# Patient Record
Sex: FEMALE | Race: WHITE | URBAN - METROPOLITAN AREA
[De-identification: names, ages, dates, MRNs, and addresses within clinical notes are randomized per-mention and may not be internally consistent; named-entity substitution may affect disease eponyms.]

---

## 2022-10-26 ENCOUNTER — OFFICE VISIT (OUTPATIENT)
Dept: ENT CLINIC | Age: 67
End: 2022-10-26
Payer: COMMERCIAL

## 2022-10-26 VITALS — BODY MASS INDEX: 20.83 KG/M2 | WEIGHT: 125 LBS | HEIGHT: 65 IN

## 2022-10-26 DIAGNOSIS — R09.82 POST-NASAL DRAINAGE: ICD-10-CM

## 2022-10-26 DIAGNOSIS — Z01.818 PREOP TESTING: ICD-10-CM

## 2022-10-26 DIAGNOSIS — J39.2 THROAT MASS: ICD-10-CM

## 2022-10-26 DIAGNOSIS — J35.1 HYPERTROPHY OF LINGUAL TONSIL: ICD-10-CM

## 2022-10-26 DIAGNOSIS — J38.01 COMPLETE PARALYSIS OF LEFT VOCAL CORD: Primary | ICD-10-CM

## 2022-10-26 DIAGNOSIS — R09.81 NASAL CONGESTION: ICD-10-CM

## 2022-10-26 PROCEDURE — 31575 DIAGNOSTIC LARYNGOSCOPY: CPT | Performed by: NURSE PRACTITIONER

## 2022-10-26 PROCEDURE — 1123F ACP DISCUSS/DSCN MKR DOCD: CPT | Performed by: NURSE PRACTITIONER

## 2022-10-26 PROCEDURE — 99204 OFFICE O/P NEW MOD 45 MIN: CPT | Performed by: NURSE PRACTITIONER

## 2022-10-26 RX ORDER — PREDNISONE 20 MG/1
20 TABLET ORAL DAILY
Qty: 7 TABLET | Refills: 0 | Status: SHIPPED | OUTPATIENT
Start: 2022-10-26 | End: 2022-11-02

## 2022-10-26 RX ORDER — AZELASTINE 1 MG/ML
1-2 SPRAY, METERED NASAL 2 TIMES DAILY PRN
Qty: 30 ML | Refills: 1 | Status: SHIPPED | OUTPATIENT
Start: 2022-10-26

## 2022-10-26 ASSESSMENT — ENCOUNTER SYMPTOMS
RESPIRATORY NEGATIVE: 1
SHORTNESS OF BREATH: 0
STRIDOR: 0
EYES NEGATIVE: 1
VOICE CHANGE: 1
RHINORRHEA: 0
SINUS PRESSURE: 0
TROUBLE SWALLOWING: 1
SINUS PAIN: 0

## 2022-10-26 NOTE — PROGRESS NOTES
[facility] Otolaryngology  Dr. Basmi Shearer. IDANIA Estrella Ms.Ed. New Consult       Patient Name:  Emmanuelle Savage  :  1955     CHIEF C/O:    Chief Complaint   Patient presents with    New Patient     NP hoarseness/swallowing issues X 6 weeks after sinus infection       HISTORY OBTAINED FROM:  patient    HISTORY OF PRESENT ILLNESS:       Tristan Yates is a 78y.o. year old female, here today for hoarseness and dysphagia, sinus congestion. Symptoms for 6 weeks  Started after sinus infection  Treated with antibiotics by PCP, sinus symptoms have improved  Was having left ear pain that has resolved  Dysphagia that has improved, feels like phlegm still in throat  Persistent hoarseness of her voice, little improvement since onset  Denies any tobacco hx   Continues to have persistent PND with cough  No current sinus congestion, rhinorrhea, sinus pain or pressure  Does have a hx of acid reflux, was treated several years ago for H Pylori  Continues to have persistent globus sensation  Family hx of throat cancer - uncles with smoking hx          Past Medical History:   Diagnosis Date    Shortness of breath      Past Surgical History:   Procedure Laterality Date    TUBAL LIGATION      WISDOM TOOTH EXTRACTION N/A        Current Outpatient Medications:     ondansetron (ZOFRAN ODT) 4 MG disintegrating tablet, Take 1 tablet by mouth every 8 hours as needed for Nausea or Vomiting, Disp: 12 tablet, Rfl: 0    albuterol (VENTOLIN HFA) 108 (90 BASE) MCG/ACT inhaler, Inhale 2 puffs into the lungs every 6 hours as needed for Wheezing or Shortness of Breath, Disp: 1 Inhaler, Rfl: 3    Bhcaffaaa-VJE-EP-APAP (RADU-SELTZER PLUS COLD & FLU PO), Take by mouth, Disp: , Rfl:   Patient has no known allergies. Social History     Tobacco Use    Smoking status: Never    Smokeless tobacco: Never   Substance Use Topics    Alcohol use: Yes    Drug use: No     History reviewed. No pertinent family history. Review of Systems   Constitutional: Negative. Negative for activity change and appetite change. HENT:  Positive for postnasal drip, trouble swallowing and voice change (Hoarseness). Negative for congestion, rhinorrhea, sinus pressure and sinus pain. Eyes: Negative. Respiratory: Negative. Negative for shortness of breath and stridor. Cardiovascular: Negative. Negative for chest pain and palpitations. Endocrine: Negative. Musculoskeletal: Negative. Skin: Negative. Neurological: Negative. Negative for dizziness. Hematological: Negative. Psychiatric/Behavioral: Negative. Ht 5' 5\" (1.651 m)   Wt 125 lb (56.7 kg)   BMI 20.80 kg/m²   Physical Exam  Constitutional:       Appearance: Normal appearance. HENT:      Head: Normocephalic. Right Ear: Tympanic membrane, ear canal and external ear normal.      Left Ear: Tympanic membrane, ear canal and external ear normal.      Nose: Congestion present. Right Turbinates: Not pale. Left Turbinates: Not pale. Mouth/Throat:      Lips: Pink. Mouth: Mucous membranes are moist.     Eyes:      Conjunctiva/sclera: Conjunctivae normal.      Pupils: Pupils are equal, round, and reactive to light. Cardiovascular:      Rate and Rhythm: Normal rate and regular rhythm. Pulses: Normal pulses. Pulmonary:      Effort: Pulmonary effort is normal. No respiratory distress. Breath sounds: No stridor. Musculoskeletal:         General: Normal range of motion. Cervical back: Normal range of motion. No rigidity. No muscular tenderness. Skin:     General: Skin is warm and dry. Neurological:      General: No focal deficit present. Mental Status: She is alert and oriented to person, place, and time. Psychiatric:         Mood and Affect: Mood normal.         Behavior: Behavior normal.         Thought Content:  Thought content normal.         Judgment: Judgment normal.       IMPRESSION/PLAN:  Endoscopy Procedure Note    Pre-operative Diagnosis: hoarseness    Post-operative Diagnosis: normal, left vocal cord paralysis, left throat mass, hypertrophy of lingual tonsils    Indications: Hoarseness, dysphagia or aspiration - not able to be clearly evaluated by indirect laryngoscopy  Evaluation of the larynx and immediate subglottis - unable to be visualized by mirror examination    Anesthesia: Lidocaine 4% and Eliseo-Synephrine 1/2%    Endoscopy Type:  laryngoscopy    Procedure Details   With the patient sitting upright in the examining chair informed consent was obtained. The right side(s) of the nose was topically anesthetized with spray. After waiting an appropriate period of time for anesthesia/ vasoconstriction to become effective, the flexible fiberoptic  flexible laryngoscope was passed through the right side(s) of the nose, and the nose, nasopharynx, oropharynx, hypopharynx and larynx were examined. An identical procedure was performed on the contralateral side. Examination was performed during quiet respiration and with phonation. I was present for the entire procedure. The following findings were noted. Findings:  Mucosa:  swollen   Nasal septum:  deviated to left   Turbinates:  swollen   Adenoid:  normal   Eustachian tubes:  normal   Mucous stranding:  present   Lesions:  absent   Modified Catalan's Maneuver not indicated   Larynx Subglottis is patent. Lesion of subglottis noted. Inflammation of the left lingual tonsil with left vocal cord paralysis and hooding of the left arytenoid. Significant edema       Condition:  Stable    Complications:  None    Pictures:                      Elisacalderonellie Rowell was seen today for new patient. Diagnoses and all orders for this visit:    Complete paralysis of left vocal cord  -     CT SOFT TISSUE NECK W WO CONTRAST; Future  -     CT CHEST W WO CONTRAST; Future    Throat mass  -     CT SOFT TISSUE NECK W WO CONTRAST; Future  -     CT CHEST W WO CONTRAST;  Future    Hypertrophy of lingual tonsil  -     CT SOFT TISSUE NECK W WO CONTRAST; Future  -     CT CHEST W WO CONTRAST; Future    Preop testing  -     BUN; Future  -     Creatinine; Future    Post-nasal drainage    Nasal congestion    Other orders  -     predniSONE (DELTASONE) 20 MG tablet; Take 1 tablet by mouth daily for 7 days  -     azelastine (ASTELIN) 0.1 % nasal spray; 1-2 sprays by Nasal route 2 times daily as needed for Rhinitis Use in each nostril as directed    Patient seen and examined today for 6-week history of hoarse voice. Due to her symptoms and family history of throat cancer a flexible laryngoscope was performed in the office today revealing paralysis of the left vocal cord, hooding of the left arytenoid, and swelling/mass in the left lingual tonsil region. There is significant edema in the entire area. At this time patient will be placed on prednisone, 20 mg once daily for 7 days as well as Astelin spray, 1 to 2 sprays twice daily as needed for postnasal drainage symptoms. She will also undergo a CT of the neck and chest with and without contrast for further evaluation of the left mass. She will follow-up in 1 month with Dr. Nisha Evans for reevaluation and possible repeat scope. She instructed to call with any new or worsening symptoms prior to her next appointment.       Nick Gomez, MSN, FNP-C  [facility], Nose and Throat    The information contained in this note has been dictated using drug and medical speech recognition software and may contain errors

## 2022-11-01 ENCOUNTER — TELEPHONE (OUTPATIENT)
Dept: ENT CLINIC | Age: 67
End: 2022-11-01

## 2022-11-01 DIAGNOSIS — J39.2 THROAT MASS: Primary | ICD-10-CM

## 2022-11-01 DIAGNOSIS — J38.01 COMPLETE PARALYSIS OF LEFT VOCAL CORD: ICD-10-CM

## 2022-11-01 DIAGNOSIS — J35.1 HYPERTROPHY OF LINGUAL TONSIL: ICD-10-CM

## 2022-11-01 DIAGNOSIS — Z01.818 PREOP TESTING: ICD-10-CM

## 2022-11-01 LAB
BUN BLDV-MCNC: 12 MG/DL (ref 6–23)
CREAT SERPL-MCNC: 0.7 MG/DL (ref 0.5–1)
GFR SERPL CREATININE-BSD FRML MDRD: >60 ML/MIN/1.73

## 2022-11-01 NOTE — TELEPHONE ENCOUNTER
Pt needs blood work done today at [address] [facility] in [facility]. Pt has been left VM regarding this issue and told that if she cannot have labs done the imaging will have to be rescheduled. Pt can have labs done 90 minutes prior in the AM but they need to be done at [facility].

## 2022-11-01 NOTE — TELEPHONE ENCOUNTER
[place] from ct scan [facility] called and stated that usually they do not do Ct scan chest and soft tissue neck with and without contrast. If they do both usually it is with only .  Also this patient has not had her lab work done yet which can delay the testing tomorrow

## 2022-11-01 NOTE — TELEPHONE ENCOUNTER
Called patient and left a message on answering machine that they were concerned results would not be back in time for her scans tomorrrow if she waits til then to get them done. Called FP (Dr. Camron Stoddard) to see if she has had any lab work recently .  office did not have any labs after June 2022

## 2022-11-10 ENCOUNTER — TELEPHONE (OUTPATIENT)
Dept: ENT CLINIC | Age: 67
End: 2022-11-10

## 2022-11-11 NOTE — TELEPHONE ENCOUNTER
Returned call to patient with results who stated understanding. Reminded of f/u apt date/time/location.

## 2022-12-02 ENCOUNTER — OFFICE VISIT (OUTPATIENT)
Dept: ENT CLINIC | Age: 67
End: 2022-12-02
Payer: COMMERCIAL

## 2022-12-02 ENCOUNTER — TELEPHONE (OUTPATIENT)
Dept: ENT CLINIC | Age: 67
End: 2022-12-02

## 2022-12-02 ENCOUNTER — PREP FOR PROCEDURE (OUTPATIENT)
Dept: ENT CLINIC | Age: 67
End: 2022-12-02

## 2022-12-02 VITALS
BODY MASS INDEX: 21.66 KG/M2 | WEIGHT: 130 LBS | HEIGHT: 65 IN | SYSTOLIC BLOOD PRESSURE: 116 MMHG | HEART RATE: 89 BPM | DIASTOLIC BLOOD PRESSURE: 67 MMHG

## 2022-12-02 DIAGNOSIS — R09.81 NASAL CONGESTION: ICD-10-CM

## 2022-12-02 DIAGNOSIS — J38.01 COMPLETE PARALYSIS OF LEFT VOCAL CORD: Primary | ICD-10-CM

## 2022-12-02 PROBLEM — J38.00 VOCAL CORD PARALYSIS: Status: ACTIVE | Noted: 2022-12-02

## 2022-12-02 PROCEDURE — 1123F ACP DISCUSS/DSCN MKR DOCD: CPT | Performed by: OTOLARYNGOLOGY

## 2022-12-02 PROCEDURE — 99213 OFFICE O/P EST LOW 20 MIN: CPT | Performed by: OTOLARYNGOLOGY

## 2022-12-02 NOTE — PROGRESS NOTES
[facility] Otolaryngology  Dr. Elliott Severe. Hoyle Feast. Ms.Ed        Patient Name:  Bing Martins  :  1955     CHIEF C/O:    Chief Complaint   Patient presents with    Other     CT Results       HISTORY OBTAINED FROM:  patient    HISTORY OF PRESENT ILLNESS:       Peace Palomino is a 78y.o. year old female, here today for follow up of here for evaluation of left true vocal cord paralysis of idiopathic etiology. Patient underwent a CT scan of the skull-based the chest demonstrated no significant recurrent laryngeal nerve pathology which was not found today. Patient continues to have a significantly hoarse for significant difficulty with projection and at times ambulation due to the lack of compression. Denies any nausea vomiting denies any active rest shortness of breath. Denies any nasal congestion epistaxis no complaints of neck mass tumor lesions. Past Medical History:   Diagnosis Date    Shortness of breath      Past Surgical History:   Procedure Laterality Date    TUBAL LIGATION      WISDOM TOOTH EXTRACTION N/A        Current Outpatient Medications:     azelastine (ASTELIN) 0.1 % nasal spray, 1-2 sprays by Nasal route 2 times daily as needed for Rhinitis Use in each nostril as directed, Disp: 30 mL, Rfl: 1    Oywdzvnqz-JNN-RJ-APAP (RADU-SELTZER PLUS COLD & FLU PO), Take by mouth, Disp: , Rfl:     ondansetron (ZOFRAN ODT) 4 MG disintegrating tablet, Take 1 tablet by mouth every 8 hours as needed for Nausea or Vomiting, Disp: 12 tablet, Rfl: 0    albuterol (VENTOLIN HFA) 108 (90 BASE) MCG/ACT inhaler, Inhale 2 puffs into the lungs every 6 hours as needed for Wheezing or Shortness of Breath, Disp: 1 Inhaler, Rfl: 3  Patient has no known allergies. Social History     Tobacco Use    Smoking status: Never    Smokeless tobacco: Never   Substance Use Topics    Alcohol use: Yes    Drug use: No     No family history on file. Review of Systems   Constitutional:  Negative for chills and fever.    HENT:  Positive for voice change. Negative for ear discharge and hearing loss. Respiratory:  Negative for cough and shortness of breath. Cardiovascular:  Negative for chest pain and palpitations. Gastrointestinal:  Negative for vomiting. Skin:  Negative for rash. Allergic/Immunologic: Negative for environmental allergies. Neurological:  Negative for dizziness and headaches. Hematological:  Does not bruise/bleed easily. All other systems reviewed and are negative. /67 (Site: Left Upper Arm, Position: Sitting, Cuff Size: Medium Adult)   Pulse 89   Ht 5' 5\" (1.651 m)   Wt 130 lb (59 kg)   BMI 21.63 kg/m²   Physical Exam  Vitals and nursing note reviewed. Constitutional:       Appearance: She is well-developed. HENT:      Head: Normocephalic and atraumatic. Right Ear: Tympanic membrane, ear canal and external ear normal.      Left Ear: Tympanic membrane, ear canal and external ear normal.      Nose: No congestion or rhinorrhea. Mouth/Throat:      Mouth: Mucous membranes are dry. Eyes:      Pupils: Pupils are equal, round, and reactive to light. Neck:      Thyroid: No thyromegaly. Trachea: No tracheal deviation. Cardiovascular:      Rate and Rhythm: Normal rate. Pulmonary:      Effort: Pulmonary effort is normal. No respiratory distress. Musculoskeletal:         General: No tenderness. Normal range of motion. Cervical back: Normal range of motion and neck supple. Skin:     General: Skin is warm and dry. Neurological:      Mental Status: She is alert. Cranial Nerves: No cranial nerve deficit. IMPRESSION/PLAN:  Patient seen and examined for history of left-sided vocal cord paralysis with negative CT scan for recurrent laryngeal nerve pathology. Patient was scheduled for microlaryngoscopy left vocal cord augmentation risk and benefit occluding bleeding infection hoarseness and need for future surgeries all reviewed today. Dr. Mauro Estrella D.O., Ms. Ed.  Otolaryngology Facial Plastic Surgery  :  [id number] [facility] Otolaryngology/Facial Plastic Surgery Residency  Associate Clinical Professor:  Cierra Kwan [facility]

## 2022-12-02 NOTE — TELEPHONE ENCOUNTER
Prior Authorization Form:      DEMOGRAPHICS:                     Patient Name:  Cici Gloria  Patient :  1955            Insurance:  Payor: Jahaira Nj / Plan: Jahaira N[place] / Product Type: *No Product type* /   Insurance ID Number:    Payer/Plan Subscr  Sex Relation Sub. Ins. ID Effective Group Num   1. [address] [facility] J 1955 Female Self [id number]                                    [id number]   2.  MEDICARE - [place] 1955 Female Self [id number]                                    [id number]         DIAGNOSIS & PROCEDURE:                       Procedure/Operation: MICROLARYNGSCOPY WITH LEFT TRUE VOCAL CORD AUGMENTATION           CPT Code: 44451 [id number]     Diagnosis:  LEFT TRUE VOCAL CORD PARALYSIS     ICD10 Code: J38.00    Location:  [facility]    Surgeon:  Hilton Bolanos INFORMATION:                          Date: 23    Time: N/A              Anesthesia:  General                                                       Status:  Outpatient        Special Comments:  Jag Barreto       Electronically signed by Kalli Mccormack MA on 2022 at 8:22 AM

## 2022-12-02 NOTE — PATIENT INSTRUCTIONS
SURGERY:_____/_____/_____    Nothing to eat or drink after midnight the night before surgery unless surgery is at [facility] or otherwise instructed by the hospital.    DO NOT TAKE ANY ASPIRIN PRODUCTS 7 days prior to surgery. Tylenol only. No Advil, Motrin, Aleve, or Ibuprofen. IF YOU ARE ON BLOOD THINNERS (PLAVIX, COUMADIN, ELIQUIS ETC) THESE WILL ALSO NEED TO BE HELD. Any illegal drugs in your system (including Marijuana even if legally prescribed) will result in your surgery being cancelled. Please be sure to check with our office or the hospital on time frame for the drugs to be out of your system. SHOULD YOUR INSURANCE CHANGE AT ANY TIME YOU MUST CONTACT OUR OFFICE. FAILURE TO DO SO MAY RESULT IN YOUR SURGERY BEING RESCHEDULED OR YOU MAY BE CHARGED AS SELF-PAY. Due to the high demand for surgery at our practice, if you cancel or reschedule your surgery two (2) times we may not reschedule you. If you need FMLA or Short Term Disability paperwork completed for your surgery, please complete your portion, ensure your name and date of birth are on them and fax them to [phone] asap. Paperwork can take up to 2 weeks to be completed. If you have any questions or concerns regarding your surgery, please contact the Surgery SchedulerMkuldeep Rogers at [phone] option 2. If you need medical clearance, you are responsible to contact your physician(s) to schedule an appointment for clearance. If clearance is not completed within 30 days of your surgery it may be cancelled. Our office will fax the appropriate forms that need to be completed to your physician(s). The location of your surgery will call you the day prior to your surgery date to let you know what time you have to be there and any other details. (they usually don't call until late afternoon- early evening.)- IF YOU HAVE QUESTIONS REGARDING THE TIME OF YOUR SURGERY, PLEASE CALL THE FACILITY YOU ARE SCHEDULED AT.              [facility], Demetrio Lopes [address] will call you a couple days prior to surgery and give you further instructions, if you have any questions, you can reach them at [phone] (per Pre-Admission testing, EKG is required for all patients age 53+, have a diagnosis of hypertension, diabetes, or on dialysis).

## 2022-12-29 ASSESSMENT — ENCOUNTER SYMPTOMS
COUGH: 0
VOMITING: 0
SHORTNESS OF BREATH: 0
VOICE CHANGE: 1

## 2023-01-11 ENCOUNTER — ANESTHESIA EVENT (OUTPATIENT)
Dept: OPERATING ROOM | Age: 68
End: 2023-01-11
Payer: MEDICARE

## 2023-01-11 NOTE — ANESTHESIA PRE PROCEDURE
Department of Anesthesiology  Preprocedure Note       Name:  Karen Ramos   Age:  78 y.o.  :  1955                                          MRN:  [id number]         Date:  2023      Surgeon: Fermin Mccann):  Troy Nicole DO    Procedure: Procedure(s): MICROLARYNGOSCOPY WITH TRUE VOCAL CORD AUGMENTATION    Medications prior to admission:   Prior to Admission medications    Medication Sig Start Date End Date Taking? Authorizing Provider   azelastine (ASTELIN) 0.1 % nasal spray 1-2 sprays by Nasal route 2 times daily as needed for Rhinitis Use in each nostril as directed 10/26/22   FERNANDO Jang - CNP   albuterol (VENTOLIN HFA) 108 (90 BASE) MCG/ACT inhaler Inhale 2 puffs into the lungs every 6 hours as needed for Wheezing or Shortness of Breath 11/12/15   Maria G Monique DO       Current medications:    No current facility-administered medications for this encounter.      Current Outpatient Medications   Medication Sig Dispense Refill    azelastine (ASTELIN) 0.1 % nasal spray 1-2 sprays by Nasal route 2 times daily as needed for Rhinitis Use in each nostril as directed 30 mL 1    albuterol (VENTOLIN HFA) 108 (90 BASE) MCG/ACT inhaler Inhale 2 puffs into the lungs every 6 hours as needed for Wheezing or Shortness of Breath 1 Inhaler 3       Allergies:  No Known Allergies    Problem List:    Patient Active Problem List   Diagnosis Code    Vocal cord paralysis J38.00       Past Medical History:        Diagnosis Date    Asthma     states gets dyspnic with exertion sometimes    COVID-19 2022    fever body aches head cold    Hyperlipidemia        Past Surgical History:        Procedure Laterality Date    ENDOSCOPY, COLON, DIAGNOSTIC      TUBAL LIGATION      WISDOM TOOTH EXTRACTION N/A        Social History:    Social History     Tobacco Use    Smoking status: Never    Smokeless tobacco: Never   Substance Use Topics    Alcohol use: Yes     Comment: 6 pack every weekend Counseling given: Not Answered      Vital Signs (Current):   Vitals:    01/06/23 0902   Weight: 130 lb (59 kg)   Height: 5' 5\" (1.651 m)                                              BP Readings from Last 3 Encounters:   12/02/22 116/67       NPO Status:  greater than 8 hours                                                                               BMI:   Wt Readings from Last 3 Encounters:   12/02/22 130 lb (59 kg)   10/26/22 125 lb (56.7 kg)     Body mass index is 21.63 kg/m². CBC: No results found for: WBC, RBC, HGB, HCT, MCV, RDW, PLT    CMP:   Lab Results   Component Value Date/Time    BUN 12 11/01/2022 01:31 PM    CREATININE 0.7 11/01/2022 01:31 PM    LABGLOM >60 11/01/2022 01:31 PM       POC Tests: No results for input(s): POCGLU, POCNA, POCK, POCCL, POCBUN, POCHEMO, POCHCT in the last 72 hours. Coags: No results found for: PROTIME, INR, APTT    HCG (If Applicable): No results found for: PREGTESTUR, PREGSERUM, HCG, HCGQUANT     ABGs: No results found for: PHART, PO2ART, BYU0ABU, MZT2AJO, BEART, T3ZKRLEB     Type & Screen (If Applicable):  No results found for: LABABO, LABRH    Drug/Infectious Status (If Applicable):  No results found for: HIV, HEPCAB    COVID-19 Screening (If Applicable): No results found for: COVID19        Anesthesia Evaluation  Patient summary reviewed no history of anesthetic complications:   Airway: Mallampati: II  TM distance: >3 FB   Neck ROM: full  Mouth opening: > = 3 FB   Dental:    (+) caps      Pulmonary: breath sounds clear to auscultation  (+) asthma:                            Cardiovascular:    (+) hyperlipidemia      ECG reviewed  Rhythm: regular                   ROS comment: EKG=SR  Neg. Precord. T waves WNL    Cleared by PCP     Neuro/Psych:   Negative Neuro/Psych ROS              GI/Hepatic/Renal:             Endo/Other:                      ROS comment: Covid 1/2022 Abdominal:             Vascular: negative vascular ROS.          Other Findings:           Anesthesia Plan      general     ASA 2     (PCP clearance on chart)  Induction: intravenous. BIS  MIPS: Postoperative opioids intended and Prophylactic antiemetics administered. Anesthetic plan and risks discussed with patient. Plan discussed with CRNA. Annika Bustamante MD   1/11/2023      DOS STAFF ADDENDUM:    Pt seen and examined, physical exam updated, chart reviewed including anesthesia, drug and allergy history. H&P reviewed. No interval changes to history or physical examination (unless noted above). NPO status confirmed. Anesthetic plan, risks, benefits, alternatives discussed with patient. Patient verbalized an understanding and agrees to proceed.      Cheryle Ates, DO  Staff Anesthesiologist  10:57 AM

## 2023-01-12 ENCOUNTER — HOSPITAL ENCOUNTER (OUTPATIENT)
Age: 68
Setting detail: OUTPATIENT SURGERY
Discharge: HOME | End: 2023-01-12
Attending: OTOLARYNGOLOGY | Admitting: OTOLARYNGOLOGY
Payer: MEDICARE

## 2023-01-12 ENCOUNTER — ANESTHESIA (OUTPATIENT)
Dept: OPERATING ROOM | Age: 68
End: 2023-01-12
Payer: MEDICARE

## 2023-01-12 VITALS
TEMPERATURE: 97 F | OXYGEN SATURATION: 98 % | HEIGHT: 65 IN | HEART RATE: 74 BPM | DIASTOLIC BLOOD PRESSURE: 64 MMHG | WEIGHT: 129 LBS | BODY MASS INDEX: 21.49 KG/M2 | SYSTOLIC BLOOD PRESSURE: 121 MMHG | RESPIRATION RATE: 14 BRPM

## 2023-01-12 DIAGNOSIS — J38.00 VOCAL CORD PARALYSIS: ICD-10-CM

## 2023-01-12 PROCEDURE — 7100000000 HC PACU RECOVERY - FIRST 15 MIN: Performed by: OTOLARYNGOLOGY

## 2023-01-12 PROCEDURE — 3700000001 HC ADD 15 MINUTES (ANESTHESIA): Performed by: OTOLARYNGOLOGY

## 2023-01-12 PROCEDURE — 6360000002 HC RX W HCPCS: Performed by: NURSE ANESTHETIST, CERTIFIED REGISTERED

## 2023-01-12 PROCEDURE — 7100000010 HC PHASE II RECOVERY - FIRST 15 MIN: Performed by: OTOLARYNGOLOGY

## 2023-01-12 PROCEDURE — 3600000003 HC SURGERY LEVEL 3 BASE: Performed by: OTOLARYNGOLOGY

## 2023-01-12 PROCEDURE — 2500000003 HC RX 250 WO HCPCS: Performed by: NURSE ANESTHETIST, CERTIFIED REGISTERED

## 2023-01-12 PROCEDURE — 3700000000 HC ANESTHESIA ATTENDED CARE: Performed by: OTOLARYNGOLOGY

## 2023-01-12 PROCEDURE — 2709999900 HC NON-CHARGEABLE SUPPLY: Performed by: OTOLARYNGOLOGY

## 2023-01-12 PROCEDURE — 2580000003 HC RX 258: Performed by: ANESTHESIOLOGY

## 2023-01-12 PROCEDURE — L8607 INJ VOCAL CORD BULKING AGENT: HCPCS | Performed by: OTOLARYNGOLOGY

## 2023-01-12 PROCEDURE — 7100000011 HC PHASE II RECOVERY - ADDTL 15 MIN: Performed by: OTOLARYNGOLOGY

## 2023-01-12 PROCEDURE — 7100000001 HC PACU RECOVERY - ADDTL 15 MIN: Performed by: OTOLARYNGOLOGY

## 2023-01-12 PROCEDURE — 31571 LARYNGOSCOP W/VC INJ + SCOPE: CPT | Performed by: OTOLARYNGOLOGY

## 2023-01-12 PROCEDURE — 3600000013 HC SURGERY LEVEL 3 ADDTL 15MIN: Performed by: OTOLARYNGOLOGY

## 2023-01-12 DEVICE — PROLARYN PLUS IS A WATER BASED INJECTABLE GEL IMPLANT USED TO TREAT VOCAL FOLD INSUFFICIENCY. THE PRINCIPLE COMPONENT OF PROLARYN PLUS IS SYNTHETIC CALCIUM HYDROXYAPATITE, A BIOMATERIAL FOUND IN BONE AND TEETH. INJECTION WITH PROLARYN PLUS AUGMENTS OR BULKS UPS THE DISPLACED OR DAMAGED VOCAL FOLD SO THAT IT CAN IMPROVE SPEAKING. THE RESULT IS LONG TERM RESTORATION AND AUGMENTATION. PROLARYN PLUS CAN BE INJECTED WITH A 26 GAUGE OR LARGER DIAMETER THIN-WALL-NEEDLE.
Type: IMPLANTABLE DEVICE | Site: VOCAL CORD | Status: FUNCTIONAL
Brand: PROLARYN PLUS INJECTABLE IMPLANT

## 2023-01-12 RX ORDER — HYDROCODONE BITARTRATE AND ACETAMINOPHEN 5; 325 MG/1; MG/1
1 TABLET ORAL ONCE
Status: CANCELLED | OUTPATIENT
Start: 2023-01-12

## 2023-01-12 RX ORDER — ONDANSETRON 2 MG/ML
INJECTION INTRAMUSCULAR; INTRAVENOUS PRN
Status: DISCONTINUED | OUTPATIENT
Start: 2023-01-12 | End: 2023-01-12 | Stop reason: SDUPTHER

## 2023-01-12 RX ORDER — SODIUM CHLORIDE 9 MG/ML
INJECTION, SOLUTION INTRAVENOUS PRN
Status: CANCELLED | OUTPATIENT
Start: 2023-01-12

## 2023-01-12 RX ORDER — SODIUM CHLORIDE 0.9 % (FLUSH) 0.9 %
5-40 SYRINGE (ML) INJECTION EVERY 12 HOURS SCHEDULED
Status: DISCONTINUED | OUTPATIENT
Start: 2023-01-12 | End: 2023-01-12 | Stop reason: HOSPADM

## 2023-01-12 RX ORDER — FENTANYL CITRATE 50 UG/ML
INJECTION, SOLUTION INTRAMUSCULAR; INTRAVENOUS PRN
Status: DISCONTINUED | OUTPATIENT
Start: 2023-01-12 | End: 2023-01-12 | Stop reason: SDUPTHER

## 2023-01-12 RX ORDER — SODIUM CHLORIDE, SODIUM LACTATE, POTASSIUM CHLORIDE, CALCIUM CHLORIDE 600; 310; 30; 20 MG/100ML; MG/100ML; MG/100ML; MG/100ML
INJECTION, SOLUTION INTRAVENOUS CONTINUOUS
Status: DISCONTINUED | OUTPATIENT
Start: 2023-01-12 | End: 2023-01-12 | Stop reason: HOSPADM

## 2023-01-12 RX ORDER — SODIUM CHLORIDE 0.9 % (FLUSH) 0.9 %
5-40 SYRINGE (ML) INJECTION PRN
Status: CANCELLED | OUTPATIENT
Start: 2023-01-12

## 2023-01-12 RX ORDER — NEOSTIGMINE METHYLSULFATE 1 MG/ML
INJECTION, SOLUTION INTRAVENOUS PRN
Status: DISCONTINUED | OUTPATIENT
Start: 2023-01-12 | End: 2023-01-12 | Stop reason: SDUPTHER

## 2023-01-12 RX ORDER — PROPOFOL 10 MG/ML
INJECTION, EMULSION INTRAVENOUS PRN
Status: DISCONTINUED | OUTPATIENT
Start: 2023-01-12 | End: 2023-01-12 | Stop reason: SDUPTHER

## 2023-01-12 RX ORDER — DIPHENHYDRAMINE HYDROCHLORIDE 50 MG/ML
INJECTION INTRAMUSCULAR; INTRAVENOUS PRN
Status: DISCONTINUED | OUTPATIENT
Start: 2023-01-12 | End: 2023-01-12 | Stop reason: SDUPTHER

## 2023-01-12 RX ORDER — FENTANYL CITRATE 0.05 MG/ML
25 INJECTION, SOLUTION INTRAMUSCULAR; INTRAVENOUS EVERY 5 MIN PRN
Status: CANCELLED | OUTPATIENT
Start: 2023-01-12

## 2023-01-12 RX ORDER — MIDAZOLAM HYDROCHLORIDE 1 MG/ML
INJECTION INTRAMUSCULAR; INTRAVENOUS PRN
Status: DISCONTINUED | OUTPATIENT
Start: 2023-01-12 | End: 2023-01-12 | Stop reason: SDUPTHER

## 2023-01-12 RX ORDER — GLYCOPYRROLATE 1 MG/5 ML
SYRINGE (ML) INTRAVENOUS PRN
Status: DISCONTINUED | OUTPATIENT
Start: 2023-01-12 | End: 2023-01-12 | Stop reason: SDUPTHER

## 2023-01-12 RX ORDER — SODIUM CHLORIDE 9 MG/ML
INJECTION, SOLUTION INTRAVENOUS PRN
Status: DISCONTINUED | OUTPATIENT
Start: 2023-01-12 | End: 2023-01-12 | Stop reason: HOSPADM

## 2023-01-12 RX ORDER — DEXAMETHASONE SODIUM PHOSPHATE 10 MG/ML
INJECTION, SOLUTION INTRAMUSCULAR; INTRAVENOUS PRN
Status: DISCONTINUED | OUTPATIENT
Start: 2023-01-12 | End: 2023-01-12 | Stop reason: SDUPTHER

## 2023-01-12 RX ORDER — EPHEDRINE SULFATE/0.9% NACL/PF 50 MG/5 ML
SYRINGE (ML) INTRAVENOUS PRN
Status: DISCONTINUED | OUTPATIENT
Start: 2023-01-12 | End: 2023-01-12 | Stop reason: SDUPTHER

## 2023-01-12 RX ORDER — SODIUM CHLORIDE 0.9 % (FLUSH) 0.9 %
5-40 SYRINGE (ML) INJECTION EVERY 12 HOURS SCHEDULED
Status: CANCELLED | OUTPATIENT
Start: 2023-01-12

## 2023-01-12 RX ORDER — SODIUM CHLORIDE 0.9 % (FLUSH) 0.9 %
5-40 SYRINGE (ML) INJECTION PRN
Status: DISCONTINUED | OUTPATIENT
Start: 2023-01-12 | End: 2023-01-12 | Stop reason: HOSPADM

## 2023-01-12 RX ORDER — ROCURONIUM BROMIDE 10 MG/ML
INJECTION, SOLUTION INTRAVENOUS PRN
Status: DISCONTINUED | OUTPATIENT
Start: 2023-01-12 | End: 2023-01-12 | Stop reason: SDUPTHER

## 2023-01-12 RX ADMIN — Medication 0.6 MG: at 12:10

## 2023-01-12 RX ADMIN — SODIUM CHLORIDE, POTASSIUM CHLORIDE, SODIUM LACTATE AND CALCIUM CHLORIDE: 600; 310; 30; 20 INJECTION, SOLUTION INTRAVENOUS at 09:46

## 2023-01-12 RX ADMIN — ONDANSETRON 4 MG: 2 INJECTION INTRAMUSCULAR; INTRAVENOUS at 12:02

## 2023-01-12 RX ADMIN — DEXAMETHASONE SODIUM PHOSPHATE 10 MG: 10 INJECTION, SOLUTION INTRAMUSCULAR; INTRAVENOUS at 11:59

## 2023-01-12 RX ADMIN — DIPHENHYDRAMINE HYDROCHLORIDE 12.5 MG: 50 INJECTION, SOLUTION INTRAMUSCULAR; INTRAVENOUS at 12:02

## 2023-01-12 RX ADMIN — Medication 15 MG: at 11:58

## 2023-01-12 RX ADMIN — MIDAZOLAM 2 MG: 1 INJECTION INTRAMUSCULAR; INTRAVENOUS at 11:38

## 2023-01-12 RX ADMIN — ROCURONIUM BROMIDE 20 MG: 10 INJECTION, SOLUTION INTRAVENOUS at 11:48

## 2023-01-12 RX ADMIN — Medication 3 MG: at 12:10

## 2023-01-12 RX ADMIN — PROPOFOL 150 MG: 10 INJECTION, EMULSION INTRAVENOUS at 11:46

## 2023-01-12 RX ADMIN — FENTANYL CITRATE 50 MCG: 50 INJECTION INTRAMUSCULAR; INTRAVENOUS at 11:46

## 2023-01-12 RX ADMIN — Medication 10 MG: at 11:55

## 2023-01-12 RX ADMIN — PROPOFOL 150 MG: 10 INJECTION, EMULSION INTRAVENOUS at 11:50

## 2023-01-12 ASSESSMENT — PAIN - FUNCTIONAL ASSESSMENT: PAIN_FUNCTIONAL_ASSESSMENT: NONE - DENIES PAIN

## 2023-01-12 NOTE — ANESTHESIA POSTPROCEDURE EVALUATION
Department of Anesthesiology  Postprocedure Note    Patient: Evaristo Dumont  MRN: [id number]  YOB: 1955  Date of evaluation: 1/12/2023      Procedure Summary     Date: 01/12/23 Room / Location: [address] [place] [address] 03 / [address] [facility]    Anesthesia Start: 3949 Anesthesia Stop: 2696    Procedure: MICROLARYNGOSCOPY WITH TRUE VOCAL CORD AUGMENTATION (Left) Diagnosis:       Vocal cord paralysis      (Vocal cord paralysis [J38.00])    Surgeons: Bernard Prince DO Responsible Provider: Car Tellez DO    Anesthesia Type: General ASA Status: 2          Anesthesia Type: General    Donnie Phase I: Donnie Score: 10    Donnie Phase II: Donnie Score: 10      Anesthesia Post Evaluation    Patient location during evaluation: PACU  Patient participation: complete - patient participated  Level of consciousness: awake and alert  Airway patency: patent  Nausea & Vomiting: no nausea and no vomiting  Complications: no  Cardiovascular status: hemodynamically stable  Respiratory status: acceptable  Hydration status: euvolemic

## 2023-01-12 NOTE — DISCHARGE INSTRUCTIONS
Follow up with Dr Niko White in 7 days     no voice use for 3 day(s)    Soft diet for the first few days, then advance as tolerated           Nausea and Vomiting After Surgery: Care Instructions  Your Care Instructions     After you've had surgery, you may feel sick to your stomach (nauseated) or you may vomit. Sometimes anesthesia can make you feel sick. It's a common side effect and often doesn't last long. Pain also can make you feel sick or vomit. After the anesthesia wears off, you may feel pain from the incision (cut). That pain could then upset your stomach. Taking pain medicine can also make you feel sick to your stomach. Whatever the cause, you may get medicine that can help. There are also some things you can do at home to prevent nausea and feel better. The doctor has checked you carefully, but problems can develop later. If you notice any problems or new symptoms, get medical treatment right away. Follow-up care is a key part of your treatment and safety. Be sure to make and go to all appointments, and call your doctor if you are having problems. It's also a good idea to know your test results and keep a list of the medicines you take. How can you care for yourself at home? Be safe with medicines. Read and follow all instructions on the label. If the doctor gave you a prescription medicine for pain, take it as prescribed. If you are not taking a prescription pain medicine, ask your doctor if you can take an over-the-counter medicine. Take your pain medicine as soon as you have pain. It works better if you take it before the pain gets bad. Call your doctor if you have any problems with your medicine. Rest in bed until you feel better. To prevent dehydration, drink plenty of fluids. Choose water and other clear liquids until you feel better. If you have kidney, heart, or liver disease and have to limit fluids, talk with your doctor before you increase the amount of fluids you drink.   When you are able to eat, try clear soups, mild foods, and liquids until all symptoms are gone for 12 to 48 hours. Other good choices include dry toast, crackers, cooked cereal, and gelatin dessert, such as Jell-O. Do not smoke. Smoking and being around smoke can make nausea worse. If you need help quitting, talk to your doctor about stop-smoking programs and medicines. These can increase your chances of quitting for good. When should you call for help? Call 911  anytime you think you may need emergency care. For example, call if:    You passed out (lost consciousness). Call your doctor now or seek immediate medical care if:    You have new or worse nausea or vomiting. You are too sick to your stomach to drink any fluids. You cannot keep down fluids. You have symptoms of dehydration, such as:  Dry eyes and a dry mouth. Passing only a little urine. Feeling thirstier than usual.     Your pain medicine is not helping. You are dizzy or lightheaded, or you feel like you may faint. Watch closely for changes in your health, and be sure to contact your doctor if:    You do not get better as expected. Current as of: March 9, 2022               Content Version: 13.5  © 2504-7891 [employer]. Care instructions adapted under license by [facility] ([facility]). If you have questions about a medical condition or this instruction, always ask your healthcare professional. [place] [address] any warranty or liability for your use of this information.

## 2023-01-12 NOTE — OP NOTE
Operative Note      Patient: Caryn Rebolledo  YOB: 1955  MRN: [id number]    Date of Procedure: 1/12/2023    Pre-Op Diagnosis: Vocal cord paralysis [J38.00]    Post-Op Diagnosis: Same       Procedure(s): MICROLARYNGOSCOPY WITH TRUE VOCAL CORD AUGMENTATION    Surgeon(s):  Ginna Oden DO    Assistant:   Resident: Hillary Montana DO    Anesthesia: General    Estimated Blood Loss (mL): Minimal    Complications: None    Specimens:   * No specimens in log *    Implants:  Implant Name Type Inv. Item Serial No.  Lot No. LRB No. Used Action   IMPLANT LARYN 1ML GEL INJ VOCAL CRD MEDIALIZATION PROLARYN - [id number]  IMPLANT LARYN 1ML GEL INJ VOCAL CRD MEDIALIZATION [employer] [id number] N/A 1 Implanted         Drains: * No LDAs found *    Findings: L vocal cord paralysis     Detailed Description of Procedure: Indication: 78 y.o. female with history of left true vocal cord paralysis for years Pt met indication to try to protect his airway with minimal intervention. Procedure: Pt was consented preoperatively, taken back to the operating room and identified appropriately. Pt was then given to anesthesia for intubation. Once pt was appropriately intubated, the bed was elevated to approximately 3 ft. A Dedo scope was then inserted into the pts oral cavity and advanced to the level of the true vocal cords bilaterally, This was held in place. The 0 degree endoscope was placed into the dedo and visualized the cords. The left cord was bowed laterally. A laryngeal suction was then used to move the false cord laterally to see the lateral aspect of the thyroarytenoid muscle. The injection needle filled with ProLaryn was then advanced and placed submucosally in the middle aspect of the cord. 1.5 cc of product was injected and the cord was visualized moving medially.   The needle was then placed anterior to the initial injection and posterior to the initial injection to inject a total of 2.5 cc of product. The left cord was over injected approximately 2mm over midline. Th Pt was then turned back to anesthesia for appropriate awakening. Pt tolerated procedure well. Dr. Deanne Acevedo was present the entire case.       Electronically signed by Bernard Hager DO on 1/12/2023 at 12:13 PM

## 2023-01-12 NOTE — H&P
[facility] Otolaryngology  Dr. Sarbjit Abbott. German Maier. Ms.Ed           Patient Name:  Kev Dumont  :  1955      CHIEF C/O:         Chief Complaint   Patient presents with    Other       CT Results         HISTORY OBTAINED FROM:  patient     HISTORY OF PRESENT ILLNESS:       [facility] is a 78y.o. year old female, here today for follow up of here for evaluation of left true vocal cord paralysis of idiopathic etiology. Patient underwent a CT scan of the skull-based the chest demonstrated no significant recurrent laryngeal nerve pathology which was not found today. Patient continues to have a significantly hoarse for significant difficulty with projection and at times ambulation due to the lack of compression. Denies any nausea vomiting denies any active rest shortness of breath. Denies any nasal congestion epistaxis no complaints of neck mass tumor lesions. Past Medical History        Past Medical History:   Diagnosis Date    Shortness of breath           Past Surgical History         Past Surgical History:   Procedure Laterality Date    TUBAL LIGATION        WISDOM TOOTH EXTRACTION N/A             Current Medication      Current Outpatient Medications:     azelastine (ASTELIN) 0.1 % nasal spray, 1-2 sprays by Nasal route 2 times daily as needed for Rhinitis Use in each nostril as directed, Disp: 30 mL, Rfl: 1    Okwxuoocw-CUY-WU-APAP (RADU-SELTZER PLUS COLD & FLU PO), Take by mouth, Disp: , Rfl:     ondansetron (ZOFRAN ODT) 4 MG disintegrating tablet, Take 1 tablet by mouth every 8 hours as needed for Nausea or Vomiting, Disp: 12 tablet, Rfl: 0    albuterol (VENTOLIN HFA) 108 (90 BASE) MCG/ACT inhaler, Inhale 2 puffs into the lungs every 6 hours as needed for Wheezing or Shortness of Breath, Disp: 1 Inhaler, Rfl: 3     Patient has no known allergies. Social History           Tobacco Use    Smoking status: Never    Smokeless tobacco: Never   Substance Use Topics    Alcohol use: Yes    Drug use:  No Family History   No family history on file. Review of Systems   Constitutional:  Negative for chills and fever. HENT:  Positive for voice change. Negative for ear discharge and hearing loss. Respiratory:  Negative for cough and shortness of breath. Cardiovascular:  Negative for chest pain and palpitations. Gastrointestinal:  Negative for vomiting. Skin:  Negative for rash. Allergic/Immunologic: Negative for environmental allergies. Neurological:  Negative for dizziness and headaches. Hematological:  Does not bruise/bleed easily. All other systems reviewed and are negative. /67 (Site: Left Upper Arm, Position: Sitting, Cuff Size: Medium Adult)   Pulse 89   Ht 5' 5\" (1.651 m)   Wt 130 lb (59 kg)   BMI 21.63 kg/m²   Physical Exam  Vitals and nursing note reviewed. Constitutional:       Appearance: She is well-developed. HENT:      Head: Normocephalic and atraumatic. Right Ear: Tympanic membrane, ear canal and external ear normal.      Left Ear: Tympanic membrane, ear canal and external ear normal.      Nose: No congestion or rhinorrhea. Mouth/Throat:      Mouth: Mucous membranes are dry. Eyes:      Pupils: Pupils are equal, round, and reactive to light. Neck:      Thyroid: No thyromegaly. Trachea: No tracheal deviation. Cardiovascular:      Rate and Rhythm: Normal rate. Pulmonary:      Effort: Pulmonary effort is normal. No respiratory distress. Musculoskeletal:         General: No tenderness. Normal range of motion. Cervical back: Normal range of motion and neck supple. Skin:     General: Skin is warm and dry. Neurological:      Mental Status: She is alert. Cranial Nerves: No cranial nerve deficit. IMPRESSION/PLAN:  Patient seen and examined for history of left-sided vocal cord paralysis with negative CT scan for recurrent laryngeal nerve pathology.   Patient was scheduled for microlaryngoscopy left vocal cord augmentation risk and benefit occluding bleeding infection hoarseness and need for future surgeries all reviewed today. Dr. Judge Adrianna Clark.  Otolaryngology Facial Plastic Surgery  :  [facility] Otolaryngology/Facial Plastic Surgery Residency  Associate Clinical Professor:  AISHA Hernandez  [facility]

## 2023-01-12 NOTE — H&P
Donovan Deshpande was seen and re-examined preoperatively today, January 12, 2023. There was no substantial change in her physical and medical status. Patient is fit for the proposed surgical procedure. All questions were appropriately addressed and had no further questions regarding the risks, benefits, and alternatives of the procedure. Donovan Deshpande and family wished to proceed.     Clotilde Schwab, DO  Resident Physician  [facility])  Otolaryngology Residency  1/12/2023  9:11 AM

## 2023-01-20 ENCOUNTER — OFFICE VISIT (OUTPATIENT)
Dept: ENT CLINIC | Age: 68
End: 2023-01-20
Payer: MEDICARE

## 2023-01-20 VITALS
SYSTOLIC BLOOD PRESSURE: 120 MMHG | HEIGHT: 65 IN | WEIGHT: 128 LBS | DIASTOLIC BLOOD PRESSURE: 67 MMHG | BODY MASS INDEX: 21.33 KG/M2 | HEART RATE: 80 BPM

## 2023-01-20 DIAGNOSIS — J38.01 COMPLETE PARALYSIS OF LEFT VOCAL CORD: Primary | ICD-10-CM

## 2023-01-20 PROCEDURE — 1123F ACP DISCUSS/DSCN MKR DOCD: CPT | Performed by: OTOLARYNGOLOGY

## 2023-01-20 PROCEDURE — 99212 OFFICE O/P EST SF 10 MIN: CPT | Performed by: OTOLARYNGOLOGY

## 2023-01-21 NOTE — PROGRESS NOTES
[id number] [facility] Otolaryngology  Dr. Sydney Wilkins. Kt [place], [facility] Follow Up        Patient Name:  Nadine Flowers  :  1955     CHIEF C/O:    Chief Complaint   Patient presents with    Post-Op Check     1wk p/o microlaryngoscopy w/ tvc augmentation       HISTORY OBTAINED FROM:  patient    HISTORY OF PRESENT ILLNESS:       Gene Hanson is a 78y.o. year old female, here today for follow up of vocal cord augmentation for hoarseness, and known idiopathic vocal cord paralysis. Patient is doing well, she has mild hoarseness but is improved projection. No complaints of sore throat fever chills difficulty swallowing. Review of Systems   Constitutional:  Negative for chills and fever. HENT:  Positive for voice change. Negative for ear discharge and hearing loss. Respiratory:  Negative for cough and shortness of breath. Cardiovascular:  Negative for chest pain and palpitations. Gastrointestinal:  Negative for vomiting. Skin:  Negative for rash. Allergic/Immunologic: Negative for environmental allergies. Neurological:  Negative for dizziness and headaches. Hematological:  Does not bruise/bleed easily. All other systems reviewed and are negative. /67 (Site: Left Upper Arm, Position: Sitting, Cuff Size: Medium Adult)   Pulse 80   Ht 5' 5\" (1.651 m)   Wt 128 lb (58.1 kg)   BMI 21.30 kg/m²   Physical Exam  Vitals and nursing note reviewed. Constitutional:       Appearance: She is well-developed. HENT:      Head: Normocephalic and atraumatic. Right Ear: Tympanic membrane and ear canal normal.      Left Ear: Tympanic membrane and ear canal normal.      Nose: No congestion or rhinorrhea. Eyes:      Pupils: Pupils are equal, round, and reactive to light. Neck:      Thyroid: No thyromegaly. Trachea: No tracheal deviation. Cardiovascular:      Rate and Rhythm: Normal rate. Pulmonary:      Effort: Pulmonary effort is normal. No respiratory distress.    Musculoskeletal: General: Normal range of motion. Cervical back: Normal range of motion. Lymphadenopathy:      Cervical: No cervical adenopathy. Skin:     General: Skin is warm. Findings: No erythema. Neurological:      Mental Status: She is alert. Cranial Nerves: No cranial nerve deficit. IMPRESSION/PLAN:  Patient seen and examined for history of new level cord paralysis status post augmentation. Patient has some baseline gravelly tone however significant provement projection continue post equal utilization, follow-up in 3 to 4 months. Repeat scope at that time, for consideration of repeat augmentation versus formal thyroplasty. Dr. Lilly Estrella Otolaryngology/Facial Plastic Surgery Residency  Associate Clinical Professor:  Chandrakant Mercedes, [facility]

## 2023-01-22 ASSESSMENT — ENCOUNTER SYMPTOMS
COUGH: 0
VOMITING: 0
SHORTNESS OF BREATH: 0
VOICE CHANGE: 1

## 2023-04-27 ENCOUNTER — TELEPHONE (OUTPATIENT)
Dept: ADMINISTRATIVE | Age: 68
End: 2023-04-27

## 2023-05-26 ENCOUNTER — OFFICE VISIT (OUTPATIENT)
Dept: ENT CLINIC | Age: 68
End: 2023-05-26
Payer: MEDICARE

## 2023-05-26 VITALS
WEIGHT: 128 LBS | HEIGHT: 65 IN | SYSTOLIC BLOOD PRESSURE: 129 MMHG | DIASTOLIC BLOOD PRESSURE: 74 MMHG | HEART RATE: 58 BPM | BODY MASS INDEX: 21.33 KG/M2

## 2023-05-26 DIAGNOSIS — J38.01 COMPLETE PARALYSIS OF LEFT VOCAL CORD: Primary | ICD-10-CM

## 2023-05-26 DIAGNOSIS — J35.1 HYPERTROPHY OF LINGUAL TONSIL: ICD-10-CM

## 2023-05-26 DIAGNOSIS — R09.81 NASAL CONGESTION: ICD-10-CM

## 2023-05-26 PROCEDURE — 99213 OFFICE O/P EST LOW 20 MIN: CPT | Performed by: OTOLARYNGOLOGY

## 2023-05-26 PROCEDURE — 1123F ACP DISCUSS/DSCN MKR DOCD: CPT | Performed by: OTOLARYNGOLOGY

## 2023-05-26 RX ORDER — CHOLECALCIFEROL (VITAMIN D3) 1250 MCG
CAPSULE ORAL
COMMUNITY

## 2023-05-26 ASSESSMENT — ENCOUNTER SYMPTOMS
COUGH: 0
VOICE CHANGE: 1
SHORTNESS OF BREATH: 0
VOMITING: 0

## (undated) DEVICE — HEAD AND NECK PACK: Brand: CONVERTORS

## (undated) DEVICE — STERILE POLYISOPRENE POWDER-FREE SURGICAL GLOVES WITH EMOLLIENT COATING: Brand: PROTEXIS

## (undated) DEVICE — MEDI-VAC NON-CONDUCTIVE SUCTION TUBING: Brand: CARDINAL HEALTH

## (undated) DEVICE — PACKING 8004007 NEURAY 200PK 13X76MM: Brand: NEURAY ®

## (undated) DEVICE — ANTI-FOG SOLUTION WITH FOAM PAD: Brand: DEVON

## (undated) DEVICE — SOLUTION IV IRRIG POUR BRL 0.9% SODIUM CHL 2F7124

## (undated) DEVICE — TOWEL OR BLUEE 16X26IN ST 8 PACK ORB08 16X26ORTWL

## (undated) DEVICE — PEN: MARKING STD 100/CS: Brand: MEDICAL ACTION INDUSTRIES

## (undated) DEVICE — GAUZE,SPONGE,4"X4",12PLY,STERILE,LF,2'S: Brand: MEDLINE